# Patient Record
Sex: MALE | Race: OTHER | Employment: OTHER | URBAN - METROPOLITAN AREA
[De-identification: names, ages, dates, MRNs, and addresses within clinical notes are randomized per-mention and may not be internally consistent; named-entity substitution may affect disease eponyms.]

---

## 2022-05-19 ENCOUNTER — NEW PATIENT (OUTPATIENT)
Dept: URBAN - METROPOLITAN AREA CLINIC 38 | Facility: CLINIC | Age: 62
End: 2022-05-19

## 2022-05-19 DIAGNOSIS — H02.886: ICD-10-CM

## 2022-05-19 DIAGNOSIS — H02.883: ICD-10-CM

## 2022-05-19 DIAGNOSIS — H52.7: ICD-10-CM

## 2022-05-19 DIAGNOSIS — H25.813: ICD-10-CM

## 2022-05-19 PROCEDURE — 92134 CPTRZ OPH DX IMG PST SGM RTA: CPT

## 2022-05-19 PROCEDURE — 92015 DETERMINE REFRACTIVE STATE: CPT

## 2022-05-19 PROCEDURE — 92004 COMPRE OPH EXAM NEW PT 1/>: CPT

## 2022-05-19 ASSESSMENT — VISUAL ACUITY
OS_BAT: 20/200-
OS_PH: 20/40
OU_SC: 20/20
OU_SC: J10-
OD_SC: 20/20

## 2022-05-19 ASSESSMENT — KERATOMETRY
OD_AXISANGLE_DEGREES: 170
OS_K2POWER_DIOPTERS: 40.25
OS_K1POWER_DIOPTERS: 39.75
OD_K1POWER_DIOPTERS: 40.75
OD_K2POWER_DIOPTERS: 41.00
OS_AXISANGLE_DEGREES: 135
OS_AXISANGLE2_DEGREES: 45
OD_AXISANGLE2_DEGREES: 80

## 2022-05-19 ASSESSMENT — TONOMETRY
OS_IOP_MMHG: 11
OD_IOP_MMHG: 12

## 2022-05-19 NOTE — PATIENT DISCUSSION
Discussed options with pt. He could be a candidate for any lens. Sched with [facility] if possible because he did his Mother in [facility] surgery. If not enough time before he leaves for [facility], then will sched otherwise.

## 2022-05-19 NOTE — PATIENT DISCUSSION
Glasses Prescription given to patient. Rec pt get his best improvement to BVA with cataract surgery. Canot correct OS fully due to anisometropia it would cause.

## 2022-05-23 ENCOUNTER — CONSULTATION/EVALUATION (OUTPATIENT)
Dept: URBAN - METROPOLITAN AREA CLINIC 39 | Facility: CLINIC | Age: 62
End: 2022-05-23

## 2022-05-23 DIAGNOSIS — H02.883: ICD-10-CM

## 2022-05-23 DIAGNOSIS — H25.813: ICD-10-CM

## 2022-05-23 DIAGNOSIS — H04.123: ICD-10-CM

## 2022-05-23 PROCEDURE — 92004 COMPRE OPH EXAM NEW PT 1/>: CPT

## 2022-05-23 PROCEDURE — 92025-3 CORNEAL TOPO, REFUSED

## 2022-05-23 PROCEDURE — 92136TC INTERFEROMETRY - TECHNICAL COMPONENT

## 2022-05-23 ASSESSMENT — KERATOMETRY
OD_AXISANGLE2_DEGREES: 80
OS_K2POWER_DIOPTERS: 40.25
OS_AXISANGLE2_DEGREES: 45
OS_K1POWER_DIOPTERS: 39.75
OS_AXISANGLE_DEGREES: 135
OD_AXISANGLE_DEGREES: 170
OD_K1POWER_DIOPTERS: 40.75
OD_K2POWER_DIOPTERS: 41.00

## 2022-05-23 ASSESSMENT — VISUAL ACUITY
OS_AM: 20/30+2
OS_SC: J1
OD_RAM: 20/20
OD_SC: J10
OS_BAT: 20/200
OS_SC: CF 6FT
OS_PH: 20/40
OD_BAT: 20/50
OD_SC: 20/20-2

## 2022-05-23 ASSESSMENT — TONOMETRY
OS_IOP_MMHG: 13
OD_IOP_MMHG: 12

## 2022-05-23 NOTE — PATIENT DISCUSSION
DISCUSSED ALL LENS OPTIONS IN GREAT DETAIL. PT UNDERSTANDS IF HE CHOOSES BASIC/BASIC PLUS WE MAY MAKE HIM MORE DEPENDENT ON GLASSES THAN HE IS RIGHT NOW. DISCUSSED DOING MONO W/CV OU.

## 2022-05-23 NOTE — PATIENT DISCUSSION
PLAN: CE/IOL OS THEN OD, goal -2.00 OS marly OD, p.o w/Dr. Belen Concepcion, no Dahlia Schooling needed. Candidate for all lens options. Pt wants CV mono ou. No Imprimis ou. Lengthy discussion about each lens package w/pt and spouse. Discussed expectations. **Did not send prescriptions in until pt decides on sx**.

## 2022-05-23 NOTE — PATIENT DISCUSSION
**06/06/2022 Pt has decided to go with Basic cataract surgery OU and requesting Imprimis dops. Carmina**.

## 2022-05-23 NOTE — PATIENT DISCUSSION
PLAN: CE/IOL OS THEN OD, goal -2.00 OS marly OD, p.o w/Dr. Herbert Fraction, no Claudean Serafin needed. Candidate for all lens options. Pt wants CV mono ou. No Imprimis ou. Lengthy discussion about each lens package w/pt and spouse. Discussed expectations. **Did not send prescriptions in until pt decides on sx**.

## 2022-06-16 ASSESSMENT — KERATOMETRY
OD_K1POWER_DIOPTERS: 40.75
OS_AXISANGLE_DEGREES: 135
OD_AXISANGLE2_DEGREES: 80
OS_AXISANGLE2_DEGREES: 45
OS_K1POWER_DIOPTERS: 39.75
OS_K2POWER_DIOPTERS: 40.25
OD_K2POWER_DIOPTERS: 41.00
OD_AXISANGLE_DEGREES: 170

## 2022-06-20 ENCOUNTER — PRE-OP/H&P (OUTPATIENT)
Dept: URBAN - METROPOLITAN AREA CLINIC 39 | Facility: CLINIC | Age: 62
End: 2022-06-20

## 2022-06-20 ENCOUNTER — SURGERY/PROCEDURE (OUTPATIENT)
Dept: URBAN - METROPOLITAN AREA CLINIC 39 | Facility: CLINIC | Age: 62
End: 2022-06-20

## 2022-06-20 DIAGNOSIS — H52.7: ICD-10-CM

## 2022-06-20 DIAGNOSIS — H25.812: ICD-10-CM

## 2022-06-20 DIAGNOSIS — H25.813: ICD-10-CM

## 2022-06-20 PROCEDURE — 66984 XCAPSL CTRC RMVL W/O ECP: CPT

## 2022-06-20 PROCEDURE — 99211HP H&P OFFICE/OUTPATIENT VISIT, EST

## 2022-06-20 NOTE — PATIENT DISCUSSION
PLAN: CE/IOL OS THEN OD, goal -2.00 OS marly OD, p.o w/Dr. Jarad Mcknight, no Kacy stu needed. Candidate for all lens options. Pt wants CV mono ou. No Imprimis ou. Lengthy discussion about each lens package w/pt and spouse. Discussed expectations. **Did not send prescriptions in until pt decides on sx**.

## 2022-06-20 NOTE — PATIENT DISCUSSION
PLAN: CE/IOL OS THEN OD, goal -2.00 OS marly OD, p.o w/Dr. Bryan Taveras, no Barber Oliveira needed. Candidate for all lens options. Pt wants CV mono ou. No Imprimis ou. Lengthy discussion about each lens package w/pt and spouse. Discussed expectations. **Did not send prescriptions in until pt decides on sx**.

## 2022-06-20 NOTE — PATIENT DISCUSSION
PLAN: CE/IOL OS THEN OD, goal -2.00 OS marly OD, p.o w/Dr. Shena Gabirel, no Sabra Holter needed. Candidate for all lens options. Pt wants CV mono ou. No Imprimis ou. Lengthy discussion about each lens package w/pt and spouse. Discussed expectations. **Did not send prescriptions in until pt decides on sx**.

## 2022-06-20 NOTE — PATIENT DISCUSSION
PLAN: CE/IOL OS THEN OD, goal -2.00 OS marly OD, p.o w/Dr. Mayo Sim, no William Caputo needed. Candidate for all lens options. Pt wants CV mono ou. No Imprimis ou. Lengthy discussion about each lens package w/pt and spouse. Discussed expectations. **Did not send prescriptions in until pt decides on sx**.

## 2022-06-21 ENCOUNTER — POST-OP (OUTPATIENT)
Dept: URBAN - METROPOLITAN AREA CLINIC 38 | Facility: CLINIC | Age: 62
End: 2022-06-21

## 2022-06-21 DIAGNOSIS — Z96.1: ICD-10-CM

## 2022-06-21 PROCEDURE — 99024 POSTOP FOLLOW-UP VISIT: CPT

## 2022-06-21 ASSESSMENT — VISUAL ACUITY
OD_SC: 20/20
OU_SC: J10
OS_SC: 20/30-2
OS_SC: J10
OU_SC: 20/20
OD_SC: J12

## 2022-06-21 ASSESSMENT — KERATOMETRY
OD_AXISANGLE2_DEGREES: 80
OS_AXISANGLE2_DEGREES: 55
OS_AXISANGLE_DEGREES: 145
OD_AXISANGLE_DEGREES: 170
OD_AXISANGLE2_DEGREES: 60
OS_K2POWER_DIOPTERS: 40.25
OD_AXISANGLE_DEGREES: 150
OD_K1POWER_DIOPTERS: 40.75
OS_AXISANGLE2_DEGREES: 45
OS_K2POWER_DIOPTERS: 39.25
OD_K1POWER_DIOPTERS: 40.75
OS_AXISANGLE_DEGREES: 135
OS_K1POWER_DIOPTERS: 40.00
OD_K2POWER_DIOPTERS: 41.00
OD_K2POWER_DIOPTERS: 41.00
OS_K1POWER_DIOPTERS: 39.75

## 2022-06-21 ASSESSMENT — TONOMETRY
OS_IOP_MMHG: 14
OD_IOP_MMHG: 12

## 2022-06-22 ASSESSMENT — KERATOMETRY
OD_K2POWER_DIOPTERS: 41.00
OD_AXISANGLE2_DEGREES: 60
OS_AXISANGLE2_DEGREES: 55
OS_AXISANGLE_DEGREES: 145
OS_K1POWER_DIOPTERS: 40.00
OS_K2POWER_DIOPTERS: 39.25
OD_K1POWER_DIOPTERS: 40.75
OD_AXISANGLE_DEGREES: 150

## 2022-06-23 ENCOUNTER — POST OP/EVAL OF SECOND EYE (OUTPATIENT)
Dept: URBAN - METROPOLITAN AREA CLINIC 38 | Facility: CLINIC | Age: 62
End: 2022-06-23

## 2022-06-23 DIAGNOSIS — Z96.1: ICD-10-CM

## 2022-06-23 DIAGNOSIS — H25.811: ICD-10-CM

## 2022-06-23 PROCEDURE — 92012 INTRM OPH EXAM EST PATIENT: CPT

## 2022-06-23 ASSESSMENT — VISUAL ACUITY
OD_SC: J12
OS_SC: 20/30
OD_SC: 20/20
OS_SC: J10

## 2022-06-23 ASSESSMENT — TONOMETRY
OS_IOP_MMHG: 10
OD_IOP_MMHG: 10

## 2022-06-24 ASSESSMENT — KERATOMETRY
OD_AXISANGLE_DEGREES: 150
OS_K1POWER_DIOPTERS: 40.00
OD_K1POWER_DIOPTERS: 40.75
OD_AXISANGLE2_DEGREES: 60
OS_AXISANGLE2_DEGREES: 55
OD_K2POWER_DIOPTERS: 41.00
OS_K2POWER_DIOPTERS: 39.25
OS_AXISANGLE_DEGREES: 145

## 2022-06-27 ENCOUNTER — SURGERY/PROCEDURE (OUTPATIENT)
Dept: URBAN - METROPOLITAN AREA CLINIC 39 | Facility: CLINIC | Age: 62
End: 2022-06-27

## 2022-06-27 ENCOUNTER — POST-OP (OUTPATIENT)
Dept: URBAN - METROPOLITAN AREA CLINIC 39 | Facility: CLINIC | Age: 62
End: 2022-06-27

## 2022-06-27 DIAGNOSIS — Z96.1: ICD-10-CM

## 2022-06-27 DIAGNOSIS — H25.811: ICD-10-CM

## 2022-06-27 DIAGNOSIS — H21.81: ICD-10-CM

## 2022-06-27 DIAGNOSIS — H52.7: ICD-10-CM

## 2022-06-27 PROCEDURE — 66984 XCAPSL CTRC RMVL W/O ECP: CPT

## 2022-06-27 ASSESSMENT — KERATOMETRY
OD_AXISANGLE2_DEGREES: 60
OS_AXISANGLE_DEGREES: 145
OS_K2POWER_DIOPTERS: 39.25
OD_K2POWER_DIOPTERS: 41.00
OS_AXISANGLE2_DEGREES: 55
OD_K1POWER_DIOPTERS: 40.75
OD_AXISANGLE_DEGREES: 150
OS_K1POWER_DIOPTERS: 40.00

## 2022-06-27 NOTE — PATIENT DISCUSSION
PLAN: CE/IOL OS THEN OD, goal -2.00 OS marly OD, p.o w/Dr. Agusto Kincaid, no Metro Rafael needed. Candidate for all lens options. Pt wants CV mono ou. No Imprimis ou. Lengthy discussion about each lens package w/pt and spouse. Discussed expectations. **Did not send prescriptions in until pt decides on sx**.

## 2022-06-28 ENCOUNTER — POST-OP (OUTPATIENT)
Dept: URBAN - METROPOLITAN AREA CLINIC 38 | Facility: CLINIC | Age: 62
End: 2022-06-28

## 2022-06-28 DIAGNOSIS — Z96.1: ICD-10-CM

## 2022-06-28 DIAGNOSIS — H52.7: ICD-10-CM

## 2022-06-28 PROCEDURE — 99024 POSTOP FOLLOW-UP VISIT: CPT

## 2022-06-28 ASSESSMENT — VISUAL ACUITY
OS_SC: J10
OS_SC: 20/20-2
OD_SC: 20/20-1
OD_SC: J10

## 2022-06-28 ASSESSMENT — KERATOMETRY
OS_AXISANGLE_DEGREES: 145
OD_K1POWER_DIOPTERS: 40.75
OD_AXISANGLE_DEGREES: 150
OD_K2POWER_DIOPTERS: 41.00
OS_K1POWER_DIOPTERS: 40.00
OS_AXISANGLE2_DEGREES: 55
OD_AXISANGLE2_DEGREES: 60
OS_K2POWER_DIOPTERS: 39.25

## 2022-06-28 ASSESSMENT — TONOMETRY
OD_IOP_MMHG: 14
OS_IOP_MMHG: 14

## 2022-06-28 NOTE — PATIENT DISCUSSION
PLAN: CE/IOL OS THEN OD, goal -2.00 OS marly OD, p.o w/Dr. Berkley Galeazzi, no Estee Omalley needed. Candidate for all lens options. Pt wants CV mono ou. No Imprimis ou. Lengthy discussion about each lens package w/pt and spouse. Discussed expectations. **Did not send prescriptions in until pt decides on sx**.

## 2022-07-19 ENCOUNTER — PREPPED CHART (OUTPATIENT)
Dept: URBAN - METROPOLITAN AREA CLINIC 38 | Facility: CLINIC | Age: 62
End: 2022-07-19

## 2022-07-19 ASSESSMENT — KERATOMETRY
OD_AXISANGLE_DEGREES: 150
OD_K2POWER_DIOPTERS: 41.00
OS_K2POWER_DIOPTERS: 39.25
OD_AXISANGLE2_DEGREES: 60
OS_K1POWER_DIOPTERS: 40.00
OS_AXISANGLE_DEGREES: 145
OD_K1POWER_DIOPTERS: 40.75
OS_AXISANGLE2_DEGREES: 55

## 2022-07-19 NOTE — PATIENT DISCUSSION
PLAN: CE/IOL OS THEN OD, goal -2.00 OS marly OD, p.o w/Dr. Marti Carranza, no Jeremy Later needed. Candidate for all lens options. Pt wants CV mono ou. No Imprimis ou. Lengthy discussion about each lens package w/pt and spouse. Discussed expectations. **Did not send prescriptions in until pt decides on sx**.

## 2022-07-20 ASSESSMENT — KERATOMETRY
OS_AXISANGLE_DEGREES: 145
OS_AXISANGLE2_DEGREES: 55
OS_K2POWER_DIOPTERS: 39.25
OD_AXISANGLE2_DEGREES: 60
OD_K1POWER_DIOPTERS: 40.75
OD_AXISANGLE_DEGREES: 150
OD_K2POWER_DIOPTERS: 41.00
OS_K1POWER_DIOPTERS: 40.00

## 2022-07-22 ENCOUNTER — POST-OP (OUTPATIENT)
Dept: URBAN - METROPOLITAN AREA CLINIC 38 | Facility: CLINIC | Age: 62
End: 2022-07-22

## 2022-07-22 DIAGNOSIS — Z96.1: ICD-10-CM

## 2022-07-22 PROCEDURE — 99024 POSTOP FOLLOW-UP VISIT: CPT

## 2022-07-22 ASSESSMENT — VISUAL ACUITY
OS_SC: 20/20
OD_SC: 20/40-
OU_SC: 20/20
OD_PH: 20/20
OU_SC: J6
OD_SC: J8
OS_SC: J10

## 2022-07-22 ASSESSMENT — TONOMETRY
OD_IOP_MMHG: 15
OS_IOP_MMHG: 14

## 2022-07-22 NOTE — PATIENT DISCUSSION
PLAN: CE/IOL OS THEN OD, goal -2.00 OS marly OD, p.o w/Dr. Melvin Mays, no Teddy Becker needed. Candidate for all lens options. Pt wants CV mono ou. No Imprimis ou. Lengthy discussion about each lens package w/pt and spouse. Discussed expectations. **Did not send prescriptions in until pt decides on sx**.

## 2025-06-03 NOTE — PATIENT DISCUSSION
**06/06/2022 Pt has decided to go with Basic cataract surgery OU and requesting Imprimis dops. Carmina**.
1 day PO: Patient is doing well post-operatively. The importance of post-op drop compliance was emphasized. Drop schedule reviewed with patient. Patient to call if any visual changes or concerns.
DISCUSSED ALL LENS OPTIONS IN GREAT DETAIL. PT UNDERSTANDS IF HE CHOOSES BASIC/BASIC PLUS WE MAY MAKE HIM MORE DEPENDENT ON GLASSES THAN HE IS RIGHT NOW. DISCUSSED DOING MONO W/CV OU.
Discussed condition and exacerbating conditions/situations (e.g., dry/arid environments, overhead fans, air conditioners, side effect of medications).
Discussed lid hygiene, warm compress and eyelid wash.
Discussed monovision. This helps lessen the dependence on glasses, but is a compromise and glasses are often still needed for some activities including driving and binocular vision.
Glasses Prescription given to patient. Rec pt get his best improvement to BVA with cataract surgery. Canot correct OS fully due to anisometropia it would cause.
Monitor.
Multifocal IOLs have an added po risk of increased glare or halos and there is no guarantee that the pt will not need glasses po or see 20/20.
PLAN: CE/IOL OS THEN OD, goal -2.00 OS marly OD, p.o w/Dr. Jonathan Harley, no Kandi Be needed. Candidate for all lens options. Pt wants CV mono ou. No Imprimis ou. Lengthy discussion about each lens package w/pt and spouse. Discussed expectations. **Did not send prescriptions in until pt decides on sx**.
PLAN: CE/IOL OS THEN OD, goal -2.00 OS marly OD, p.o w/Dr. Marti Carranza, no Jeremy Later needed. Candidate for all lens options. Pt wants CV mono ou. No Imprimis ou. Lengthy discussion about each lens package w/pt and spouse. Discussed expectations. **Did not send prescriptions in until pt decides on sx**.
Recommended artificial tears to use: 1 drop 4x a day in both eyes.
Recommended warm compresses.
Smoking cessation discussed.
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 The patient was advised that there will be a necessary period of adaptation to this new vision and they will miss their unaided reading vision. The patient understands and chooses good uncorrected distance vision with the possibility of a refractive surprise.
The types of intraocular lenses were reviewed with the patient along with a discussion of their various strengths and weaknesses.
Will bill for patrick when pt calls.
Stable